# Patient Record
Sex: MALE | Race: BLACK OR AFRICAN AMERICAN | NOT HISPANIC OR LATINO | Employment: FULL TIME | URBAN - METROPOLITAN AREA
[De-identification: names, ages, dates, MRNs, and addresses within clinical notes are randomized per-mention and may not be internally consistent; named-entity substitution may affect disease eponyms.]

---

## 2019-01-05 ENCOUNTER — HOSPITAL ENCOUNTER (EMERGENCY)
Facility: HOSPITAL | Age: 24
Discharge: HOME | End: 2019-01-05
Attending: EMERGENCY MEDICINE

## 2019-01-05 VITALS
TEMPERATURE: 98 F | HEIGHT: 68 IN | SYSTOLIC BLOOD PRESSURE: 134 MMHG | OXYGEN SATURATION: 98 % | HEART RATE: 81 BPM | RESPIRATION RATE: 20 BRPM | WEIGHT: 210 LBS | DIASTOLIC BLOOD PRESSURE: 81 MMHG | BODY MASS INDEX: 31.83 KG/M2

## 2019-01-05 DIAGNOSIS — S39.012A STRAIN OF LUMBAR REGION, INITIAL ENCOUNTER: Primary | ICD-10-CM

## 2019-01-05 PROCEDURE — 25000003 PHARM REV CODE 250: Performed by: PHYSICIAN ASSISTANT

## 2019-01-05 PROCEDURE — 63600175 PHARM REV CODE 636 W HCPCS: Performed by: PHYSICIAN ASSISTANT

## 2019-01-05 PROCEDURE — 99284 EMERGENCY DEPT VISIT MOD MDM: CPT | Mod: 25

## 2019-01-05 PROCEDURE — 96372 THER/PROPH/DIAG INJ SC/IM: CPT

## 2019-01-05 RX ORDER — IBUPROFEN 200 MG
600 TABLET ORAL EVERY 6 HOURS PRN
Qty: 30 TABLET | Refills: 0 | Status: SHIPPED | OUTPATIENT
Start: 2019-01-05

## 2019-01-05 RX ORDER — METHOCARBAMOL 500 MG/1
1000 TABLET, FILM COATED ORAL 3 TIMES DAILY PRN
Qty: 30 TABLET | Refills: 0 | Status: SHIPPED | OUTPATIENT
Start: 2019-01-05 | End: 2019-01-15

## 2019-01-05 RX ORDER — DIAZEPAM 5 MG/1
5 TABLET ORAL
Status: COMPLETED | OUTPATIENT
Start: 2019-01-05 | End: 2019-01-05

## 2019-01-05 RX ORDER — ORPHENADRINE CITRATE 30 MG/ML
30 INJECTION INTRAMUSCULAR; INTRAVENOUS
Status: DISCONTINUED | OUTPATIENT
Start: 2019-01-05 | End: 2019-01-05

## 2019-01-05 RX ORDER — KETOROLAC TROMETHAMINE 30 MG/ML
15 INJECTION, SOLUTION INTRAMUSCULAR; INTRAVENOUS
Status: COMPLETED | OUTPATIENT
Start: 2019-01-05 | End: 2019-01-05

## 2019-01-05 RX ADMIN — KETOROLAC TROMETHAMINE 15 MG: 30 INJECTION, SOLUTION INTRAMUSCULAR at 04:01

## 2019-01-05 RX ADMIN — DIAZEPAM 5 MG: 5 TABLET ORAL at 04:01

## 2019-01-05 NOTE — ED TRIAGE NOTES
Patient came to the ED via EMS with complaint of pain in his lower back x 2. Patients states that the pain has gotten worst this nursing

## 2019-01-05 NOTE — DISCHARGE INSTRUCTIONS
Take medication as prescribed. Follow-up with primary. Return to this ED if symptoms persist or worsen despite treatment, if you begin with abdominal pain, if any other problems occur.

## 2019-01-05 NOTE — ED PROVIDER NOTES
"Encounter Date: 1/5/2019       History     Chief Complaint   Patient presents with    Back Pain     lower back pain, "I think it is from carrying all those trays at work." Intermittently for weeks.      22yo M with no significant pmh with chief complaint L low back pain x 2 weeks. Pt states works as ; frequently lifting heavy trays overhead. Pt states he felt something "pull" to his L low back approx 2 weeks ago, has experienced worsening pain since that time. Unable to sleep last night 2/2 discomfort. Aching pain, sharp with certain movements, with palpation. No previous injury or surgery. No urinary complaints. No abdominal pain. No cold feet or hx AAA. No trauma. No pulsatile character to pain. No ripping/tearing character. Pain is acute, constant, severity 9/10.           Review of patient's allergies indicates:  No Known Allergies  No past medical history on file.  Past Surgical History:   Procedure Laterality Date    LIP REPAIR       Family History   Problem Relation Age of Onset    Diabetes Maternal Grandmother      Social History     Tobacco Use    Smoking status: Never Smoker   Substance Use Topics    Alcohol use: No    Drug use: No     Review of Systems   Constitutional: Negative for appetite change and fever.   HENT: Negative for sore throat.    Respiratory: Negative for shortness of breath.    Cardiovascular: Negative for chest pain.   Gastrointestinal: Negative for abdominal pain, nausea and vomiting.   Genitourinary: Negative for dysuria, penile pain and testicular pain.   Musculoskeletal: Positive for back pain and gait problem. Negative for joint swelling, myalgias and neck pain.   Skin: Negative for rash.   Neurological: Negative for dizziness, syncope, weakness, light-headedness, numbness and headaches.   Hematological: Does not bruise/bleed easily.   All other systems reviewed and are negative.      Physical Exam     Initial Vitals [01/05/19 0401]   BP Pulse Resp Temp SpO2   134/81 81 " 20 98.2 °F (36.8 °C) 98 %      MAP       --         Physical Exam    Nursing note and vitals reviewed.  Constitutional: He appears well-developed and well-nourished. He is not diaphoretic. No distress.   Overall well-appearing, nontoxic. Uncomfortable.   HENT:   Head: Normocephalic and atraumatic.   Eyes: Conjunctivae and EOM are normal. Pupils are equal, round, and reactive to light.   Neck: Normal range of motion. Neck supple.   Cardiovascular: Intact distal pulses.   1+DP bilaterally   Pulmonary/Chest: Breath sounds normal.   Abdominal: Soft. Bowel sounds are normal. He exhibits no distension. There is no tenderness.   Musculoskeletal: Normal range of motion.   No midline C/T/L ttp. Bilateral lumbar paraspinal musculature ttp. No swelling or overlying skin changes. Pain to low back with any ROM of spine. No CVA tenderness to percussion. No flank ttp.    Neurological: He is alert and oriented to person, place, and time. He has normal strength.   Skin: Skin is warm and dry. Capillary refill takes less than 2 seconds. No rash and no abscess noted. No erythema.   Psychiatric: He has a normal mood and affect. His behavior is normal. Judgment and thought content normal.         ED Course   Procedures  Labs Reviewed - No data to display       Imaging Results    None          Medical Decision Making:   Differential Diagnosis:   Lumbar strain/sprain, fracture, contusion, arthritis, AAA, dissection, nephrolithiasis, UTI  ED Management:  No GI/ issues, no bowel or bladder incontinence or retention. No saddle anesthesia. No hx spinal issues. No trauma. No paresthesia. Doubt cauda equina or cord compression. No fever, no weight loss, no trauma, no recent infection, no IV drug use. Doubt epidural abscess. No midline spinal ttp. No focal weakness. No ripping/tearing sensation. No hx AAA or aortic issues.  Neurovascularly intact distally. TTP lumbar paraspinal musculature, pain with ROM of spine. States felt he may have pulled  something while carrying trays at work. Pain x 2 weeks. Vitals reassuring. No urinary complaints, flank pain, or CVA ttp. Likely musculoskeletal. Symptomatic care. PCP f/u.                       Clinical Impression:   The encounter diagnosis was Strain of lumbar region, initial encounter.      Disposition:   Disposition: Discharged  Condition: Stable                        Ga Garcia PA-C  01/05/19 0514